# Patient Record
Sex: FEMALE | Race: WHITE | Employment: UNEMPLOYED | URBAN - METROPOLITAN AREA
[De-identification: names, ages, dates, MRNs, and addresses within clinical notes are randomized per-mention and may not be internally consistent; named-entity substitution may affect disease eponyms.]

---

## 2023-04-14 ENCOUNTER — OFFICE VISIT (OUTPATIENT)
Dept: PRIMARY CARE | Facility: CLINIC | Age: 88
End: 2023-04-14
Payer: MEDICARE

## 2023-04-14 VITALS
OXYGEN SATURATION: 97 % | HEIGHT: 65 IN | SYSTOLIC BLOOD PRESSURE: 134 MMHG | WEIGHT: 116.3 LBS | DIASTOLIC BLOOD PRESSURE: 78 MMHG | BODY MASS INDEX: 19.38 KG/M2 | HEART RATE: 70 BPM

## 2023-04-14 DIAGNOSIS — N18.2 STAGE 2 CHRONIC KIDNEY DISEASE: ICD-10-CM

## 2023-04-14 DIAGNOSIS — Z00.00 ROUTINE GENERAL MEDICAL EXAMINATION AT HEALTH CARE FACILITY: ICD-10-CM

## 2023-04-14 DIAGNOSIS — Z13.89 ENCOUNTER FOR SCREENING FOR OTHER DISORDER: ICD-10-CM

## 2023-04-14 DIAGNOSIS — E78.5 HYPERLIPIDEMIA, UNSPECIFIED HYPERLIPIDEMIA TYPE: ICD-10-CM

## 2023-04-14 DIAGNOSIS — E46 PROTEIN-CALORIE MALNUTRITION, UNSPECIFIED SEVERITY (MULTI): ICD-10-CM

## 2023-04-14 DIAGNOSIS — Z00.00 MEDICARE ANNUAL WELLNESS VISIT, SUBSEQUENT: Primary | ICD-10-CM

## 2023-04-14 DIAGNOSIS — H61.22 LEFT EAR IMPACTED CERUMEN: ICD-10-CM

## 2023-04-14 DIAGNOSIS — L98.9 SKIN LESION OF LEFT LOWER EXTREMITY: ICD-10-CM

## 2023-04-14 PROBLEM — R79.9 ABNORMAL BLOOD CHEMISTRY: Status: ACTIVE | Noted: 2023-04-14

## 2023-04-14 PROBLEM — R01.1 HEART MURMUR: Status: ACTIVE | Noted: 2023-04-14

## 2023-04-14 PROBLEM — H26.493 BILATERAL POSTERIOR CAPSULAR OPACIFICATION: Status: ACTIVE | Noted: 2023-04-14

## 2023-04-14 PROBLEM — R42 DIZZINESS: Status: RESOLVED | Noted: 2023-04-14 | Resolved: 2023-04-14

## 2023-04-14 PROBLEM — R06.00 DYSPNEA: Status: RESOLVED | Noted: 2023-04-14 | Resolved: 2023-04-14

## 2023-04-14 PROBLEM — M25.569 KNEE PAIN: Status: ACTIVE | Noted: 2023-04-14

## 2023-04-14 PROBLEM — Z85.51 HISTORY OF BLADDER CANCER: Status: ACTIVE | Noted: 2023-04-14

## 2023-04-14 PROBLEM — H35.30 AMD (AGE-RELATED MACULAR DEGENERATION), BILATERAL: Status: ACTIVE | Noted: 2023-04-14

## 2023-04-14 PROBLEM — T14.8XXA OPEN WOUND: Status: RESOLVED | Noted: 2023-04-14 | Resolved: 2023-04-14

## 2023-04-14 PROBLEM — B02.9 SHINGLES: Status: ACTIVE | Noted: 2023-04-14

## 2023-04-14 PROBLEM — I67.9 CEREBRAL VASCULAR DISEASE: Status: ACTIVE | Noted: 2023-04-14

## 2023-04-14 PROBLEM — M19.90 ARTHRITIS: Status: ACTIVE | Noted: 2023-04-14

## 2023-04-14 PROBLEM — K86.9 PANCREATIC LESION (HHS-HCC): Status: RESOLVED | Noted: 2023-04-14 | Resolved: 2023-04-14

## 2023-04-14 PROBLEM — R31.9 HEMATURIA: Status: RESOLVED | Noted: 2023-04-14 | Resolved: 2023-04-14

## 2023-04-14 PROBLEM — M79.2 NEURALGIA: Status: ACTIVE | Noted: 2023-04-14

## 2023-04-14 PROBLEM — N18.30 CHRONIC KIDNEY DISEASE, STAGE 3 (MULTI): Status: RESOLVED | Noted: 2023-04-14 | Resolved: 2023-04-14

## 2023-04-14 PROBLEM — J30.9 ALLERGIC RHINITIS: Status: ACTIVE | Noted: 2023-04-14

## 2023-04-14 PROBLEM — C67.9 BLADDER CANCER (MULTI): Status: RESOLVED | Noted: 2023-04-14 | Resolved: 2023-04-14

## 2023-04-14 PROBLEM — N18.30 CHRONIC KIDNEY DISEASE, STAGE 3 (MULTI): Status: ACTIVE | Noted: 2023-04-14

## 2023-04-14 PROBLEM — N39.0 ACUTE LOWER UTI (URINARY TRACT INFECTION): Status: RESOLVED | Noted: 2023-04-14 | Resolved: 2023-04-14

## 2023-04-14 PROBLEM — R05.9 COUGH: Status: RESOLVED | Noted: 2023-04-14 | Resolved: 2023-04-14

## 2023-04-14 PROBLEM — H10.9 CONJUNCTIVITIS: Status: RESOLVED | Noted: 2023-04-14 | Resolved: 2023-04-14

## 2023-04-14 PROBLEM — J20.9 ACUTE BRONCHITIS: Status: RESOLVED | Noted: 2023-04-14 | Resolved: 2023-04-14

## 2023-04-14 PROBLEM — H02.109 ECTROPION: Status: ACTIVE | Noted: 2023-04-14

## 2023-04-14 PROBLEM — H02.132 SENILE ECTROPION OF RIGHT LOWER EYELID: Status: ACTIVE | Noted: 2023-04-14

## 2023-04-14 PROCEDURE — G0439 PPPS, SUBSEQ VISIT: HCPCS | Performed by: PHYSICIAN ASSISTANT

## 2023-04-14 PROCEDURE — 1123F ACP DISCUSS/DSCN MKR DOCD: CPT | Performed by: PHYSICIAN ASSISTANT

## 2023-04-14 PROCEDURE — G0444 DEPRESSION SCREEN ANNUAL: HCPCS | Performed by: PHYSICIAN ASSISTANT

## 2023-04-14 PROCEDURE — 1160F RVW MEDS BY RX/DR IN RCRD: CPT | Performed by: PHYSICIAN ASSISTANT

## 2023-04-14 PROCEDURE — 69209 REMOVE IMPACTED EAR WAX UNI: CPT | Performed by: PHYSICIAN ASSISTANT

## 2023-04-14 PROCEDURE — 99213 OFFICE O/P EST LOW 20 MIN: CPT | Performed by: PHYSICIAN ASSISTANT

## 2023-04-14 PROCEDURE — 1159F MED LIST DOCD IN RCRD: CPT | Performed by: PHYSICIAN ASSISTANT

## 2023-04-14 PROCEDURE — 1033F TOBACCO NONSMOKER NOR 2NDHND: CPT | Performed by: PHYSICIAN ASSISTANT

## 2023-04-14 PROCEDURE — 1170F FXNL STATUS ASSESSED: CPT | Performed by: PHYSICIAN ASSISTANT

## 2023-04-14 PROCEDURE — 1036F TOBACCO NON-USER: CPT | Performed by: PHYSICIAN ASSISTANT

## 2023-04-14 RX ORDER — CARBOXYMETHYLCELLULOSE SODIUM 10 MG/ML
1 GEL OPHTHALMIC 2 TIMES DAILY PRN
COMMUNITY
End: 2023-10-20 | Stop reason: ALTCHOICE

## 2023-04-14 RX ORDER — EZETIMIBE AND SIMVASTATIN 10; 10 MG/1; MG/1
1 TABLET ORAL EVERY EVENING
COMMUNITY
Start: 2021-09-16 | End: 2023-06-12

## 2023-04-14 RX ORDER — NAPROXEN SODIUM 220 MG/1
81 TABLET, FILM COATED ORAL DAILY
COMMUNITY
End: 2023-10-20 | Stop reason: ALTCHOICE

## 2023-04-14 ASSESSMENT — ACTIVITIES OF DAILY LIVING (ADL)
TAKING_MEDICATION: INDEPENDENT
MANAGING_FINANCES: NEEDS ASSISTANCE
GROCERY_SHOPPING: TOTAL CARE
BATHING: NEEDS ASSISTANCE
DRESSING: INDEPENDENT
DOING_HOUSEWORK: NEEDS ASSISTANCE

## 2023-04-14 ASSESSMENT — PATIENT HEALTH QUESTIONNAIRE - PHQ9
2. FEELING DOWN, DEPRESSED OR HOPELESS: NOT AT ALL
1. LITTLE INTEREST OR PLEASURE IN DOING THINGS: NOT AT ALL
SUM OF ALL RESPONSES TO PHQ9 QUESTIONS 1 AND 2: 0

## 2023-04-14 ASSESSMENT — ENCOUNTER SYMPTOMS
OCCASIONAL FEELINGS OF UNSTEADINESS: 1
LOSS OF SENSATION IN FEET: 0
DEPRESSION: 0

## 2023-04-14 NOTE — PROGRESS NOTES
"Subjective   Reason for Visit: Saritha Kapadia is an 89 y.o. female here for a Medicare Wellness visit.     Past Medical, Surgical, and Family History reviewed and updated in chart.         -year-old female presenting with her family for Medicare wellness visit.  Overall doing okay.     Left leg lesion, nonhealing: Presents for past 6+ months.  Located on left anterior thigh just superior to the kneecap.  There is no surrounding redness, abnormal warmth, swelling.  It will occasionally bleed per patient and family.  Family concerned as the area is not healing.      Left wrist swelling: Patient denies any redness, warmth, pain, or weakness in the left wrist.  No specific injury recalled.  She has not tried anything for the swelling.    HLD: Compliant with ezetimibe-simvastatin 10-10 mg and 81 mg ASA.    CKD, stage II: Last CMP 10/11/2022 showed GFR 60, creatinine 0.87, BUN 24.    Ectropion, macular degeneration: Follows with Dr. Pedroza, last seen 4/9/2021. On AREDS.  She uses refresh drops to keep her eye wet and not irritated.    Falls: 2 in past 6 months.   One occurred when walking down stairs she was on her second to last step and thought she was on the floor.  She went to walk forward and missed the step, falling forward.  No injury recalled.  She does ambulate with a walker majority of the time.  Currently she lives alone and has to climb stairs in order to use the bathroom. Refuses to be placed in an assisted living facility.   Her family cooks meals for her often and delivers them to her.  She is supplementing diet with boost/ensure as needed    Patient Care Team:  Carey Soria PA-C as PCP - General     Objective   Vitals:  /78   Pulse 70   Ht 1.651 m (5' 5\")   Wt 52.8 kg (116 lb 4.8 oz)   SpO2 97%   BMI 19.35 kg/m²       Physical Exam  Vitals reviewed.   Constitutional:       General: She is not in acute distress.     Appearance: Normal appearance.   HENT:      Head: Normocephalic and " atraumatic.      Right Ear: Tympanic membrane, ear canal and external ear normal. There is no impacted cerumen.      Left Ear: Tympanic membrane, ear canal and external ear normal. There is impacted cerumen.      Nose: Nose normal. No congestion or rhinorrhea.      Mouth/Throat:      Mouth: Mucous membranes are moist.      Pharynx: Oropharynx is clear. No oropharyngeal exudate or posterior oropharyngeal erythema.   Eyes:      General: No scleral icterus.        Right eye: No discharge.         Left eye: No discharge.      Extraocular Movements: Extraocular movements intact.      Conjunctiva/sclera: Conjunctivae normal.      Pupils: Pupils are equal, round, and reactive to light.      Comments: Ectropion of right lower leg   Cardiovascular:      Rate and Rhythm: Normal rate and regular rhythm.      Heart sounds: Normal heart sounds. No murmur heard.     No friction rub. No gallop.   Pulmonary:      Effort: Pulmonary effort is normal. No respiratory distress.      Breath sounds: Normal breath sounds. No stridor. No wheezing, rhonchi or rales.   Abdominal:      General: Bowel sounds are normal. There is no distension.      Palpations: Abdomen is soft. There is no mass.      Tenderness: There is no abdominal tenderness. There is no right CVA tenderness or left CVA tenderness.   Musculoskeletal:         General: Normal range of motion.      Cervical back: Normal range of motion and neck supple.      Right lower leg: No edema.      Left lower leg: No edema.   Skin:     General: Skin is warm and dry.      Findings: No rash.   Neurological:      General: No focal deficit present.      Mental Status: She is alert and oriented to person, place, and time. Mental status is at baseline.      Cranial Nerves: No cranial nerve deficit.      Gait: Gait normal.   Psychiatric:         Mood and Affect: Mood normal.         Behavior: Behavior normal.         Assessment/Plan   Problem List Items Addressed This Visit       Hyperlipidemia     Current Assessment & Plan     Continue ezetimibe-simvastatin 10-10 mg and 81 mg ASA. Follow mediterranean style diet and exercise as tolerated.          Protein-calorie malnutrition, unspecified severity (CMS/HCC)    Current Assessment & Plan     Supplement diet with nutritional shakes if weight is not stable.          Stage 2 chronic kidney disease    Current Assessment & Plan     Stable. Avoid nephrotoxic medications. Drink adequate water.          Left ear impacted cerumen    Current Assessment & Plan     Flushed in office, tolerated well.          Relevant Orders    Ear cerumen removal    Skin lesion of left lower extremity    Current Assessment & Plan     Referral to Dermatology placed. Apply topical Neosporin.          Relevant Orders    Referral to Dermatology     Other Visit Diagnoses       Medicare annual wellness visit, subsequent    -  Primary    Routine general medical examination at health care facility        Encounter for screening for other disorder

## 2023-04-14 NOTE — PROGRESS NOTES
Patient ID: Saritha Kapadia is a 89 y.o. female.    Ear Cerumen Removal    Date/Time: 4/14/2023 11:26 AM    Performed by: Pooja Miramontes CMA  Authorized by: Carey Soria PA-C    Consent:     Consent obtained:  Verbal    Consent given by:  Patient    Risks, benefits, and alternatives were discussed: yes    Universal protocol:     Patient identity confirmed:  Verbally with patient  Procedure details:     Location:  L ear    Procedure type: irrigation      Procedure outcomes: cerumen removed    Post-procedure details:     Inspection:  TM intact    Hearing quality:  Improved    Procedure completion:  Tolerated with difficulty

## 2023-04-16 PROBLEM — N18.2 STAGE 2 CHRONIC KIDNEY DISEASE: Status: ACTIVE | Noted: 2023-04-16

## 2023-04-16 PROBLEM — L98.9 SKIN LESION OF LEFT LOWER EXTREMITY: Status: ACTIVE | Noted: 2023-04-16

## 2023-04-16 PROBLEM — H61.22 LEFT EAR IMPACTED CERUMEN: Status: ACTIVE | Noted: 2023-04-16

## 2023-04-16 PROBLEM — B02.9 SHINGLES: Status: RESOLVED | Noted: 2023-04-14 | Resolved: 2023-04-16

## 2023-04-16 NOTE — ASSESSMENT & PLAN NOTE
Continue ezetimibe-simvastatin 10-10 mg and 81 mg ASA. Follow mediterranean style diet and exercise as tolerated.

## 2023-06-12 DIAGNOSIS — E78.5 HYPERLIPIDEMIA, UNSPECIFIED HYPERLIPIDEMIA TYPE: Primary | ICD-10-CM

## 2023-06-12 RX ORDER — EZETIMIBE AND SIMVASTATIN 10; 10 MG/1; MG/1
TABLET ORAL
Qty: 90 TABLET | Refills: 1 | Status: SHIPPED | OUTPATIENT
Start: 2023-06-12 | End: 2023-10-26

## 2023-10-11 ENCOUNTER — TELEPHONE (OUTPATIENT)
Dept: PRIMARY CARE | Facility: CLINIC | Age: 88
End: 2023-10-11
Payer: MEDICARE

## 2023-10-11 NOTE — TELEPHONE ENCOUNTER
Chico reports that he has visited his mother and notes she has sores on her left leg, looks like blisters, would like a provider to see patient at [facility].

## 2023-10-13 ENCOUNTER — APPOINTMENT (OUTPATIENT)
Dept: PRIMARY CARE | Facility: CLINIC | Age: 88
End: 2023-10-13
Payer: MEDICARE

## 2023-10-18 ENCOUNTER — NURSING HOME VISIT (OUTPATIENT)
Dept: PRIMARY CARE | Facility: CLINIC | Age: 88
End: 2023-10-18
Payer: MEDICARE

## 2023-10-18 DIAGNOSIS — Z71.89 ADVANCED CARE PLANNING/COUNSELING DISCUSSION: ICD-10-CM

## 2023-10-18 DIAGNOSIS — M19.90 ARTHRITIS: ICD-10-CM

## 2023-10-18 DIAGNOSIS — H02.401 PTOSIS OF RIGHT EYELID: ICD-10-CM

## 2023-10-18 DIAGNOSIS — F03.90 DEMENTIA, UNSPECIFIED DEMENTIA SEVERITY, UNSPECIFIED DEMENTIA TYPE, UNSPECIFIED WHETHER BEHAVIORAL, PSYCHOTIC, OR MOOD DISTURBANCE OR ANXIETY (MULTI): Primary | ICD-10-CM

## 2023-10-18 PROCEDURE — 99349 HOME/RES VST EST MOD MDM 40: CPT

## 2023-10-18 PROCEDURE — 99497 ADVNCD CARE PLAN 30 MIN: CPT

## 2023-10-18 ASSESSMENT — PAIN SCALES - GENERAL: PAINLEVEL: 0-NO PAIN

## 2023-10-20 VITALS
HEART RATE: 97 BPM | RESPIRATION RATE: 16 BRPM | SYSTOLIC BLOOD PRESSURE: 128 MMHG | OXYGEN SATURATION: 98 % | DIASTOLIC BLOOD PRESSURE: 62 MMHG

## 2023-10-20 PROBLEM — F03.90 DEMENTIA, UNSPECIFIED DEMENTIA SEVERITY, UNSPECIFIED DEMENTIA TYPE, UNSPECIFIED WHETHER BEHAVIORAL, PSYCHOTIC, OR MOOD DISTURBANCE OR ANXIETY (MULTI): Status: ACTIVE | Noted: 2023-10-20

## 2023-10-20 RX ORDER — ACETAMINOPHEN 325 MG/1
650 TABLET ORAL EVERY 6 HOURS PRN
COMMUNITY

## 2023-10-20 ASSESSMENT — ENCOUNTER SYMPTOMS
DIZZINESS: 0
FEVER: 0
HEADACHES: 0
EYE REDNESS: 1
APPETITE CHANGE: 0
GASTROINTESTINAL NEGATIVE: 1
SHORTNESS OF BREATH: 0
ARTHRALGIAS: 0
PSYCHIATRIC NEGATIVE: 1
ENDOCRINE NEGATIVE: 1
ALLERGIC/IMMUNOLOGIC NEGATIVE: 1
COUGH: 0
RHINORRHEA: 0
FREQUENCY: 0
SLEEP DISTURBANCE: 0
CHILLS: 0
FATIGUE: 0

## 2023-10-20 NOTE — PATIENT INSTRUCTIONS

## 2023-10-20 NOTE — PROGRESS NOTES
"Subjective   Patient ID: Saritha Kapadia is a 89 y.o. female who is assisted living/ home patient being seen and evaluated for annual wellness exam.    HPI Pt currently resides in assisted living, oriented x1, comfortable and denies pain. Pt lying in bed, and welcomed visit. Pt answers limited to yes, no, ok, and short answer responses. Pt denies changes in vision, and hearing. Pt wears a full denture, and denies difficulty with chewing and swallowing. Pt states appetite is good. Pt denies headache, dizziness, congestion and runny nose despite dirty tissues bedside.     Pt denies chest pain and sob at rest and exertion. Pt denies voiding symptoms and constipation. Pt with arthritis to bilateral hands, but denies it is bothersome. Pt up in w/c, transfers with one person assist, and requires assistance with ADL's. Pt denies recent falls. Pt denies sleep disturbances. Pt denies feelings of anxiety and sadness. Pt with closed abrasions to lower ankles. Nursing reports \"pt gets into kicking matches with table mate at lunch\". Pt with no further questions. TC made to son and updated on visit.     Current Outpatient Medications on File Prior to Visit   Medication Sig Dispense Refill    acetaminophen (TylenoL) 325 mg tablet Take 2 tablets (650 mg) by mouth every 6 hours if needed for mild pain (1 - 3), headaches or fever (temp greater than 38.0 C).      ezetimibe-simvastatin (Vytorin) 10-10 mg tablet TAKE 1 TABLET DAILY IN THE EVENING 90 tablet 1    nutritional drink (Boost) liquid Take by mouth.      vit A/vit C/vit E/zinc/copper (PRESERVISION AREDS ORAL) Take 1 capsule by mouth once daily.      white petrolatum-mineral oiL (Tears Naturale PM) 94-3 % ophthalmic ointment Apply 1 Application to right eye once daily at bedtime.      [DISCONTINUED] carboxymethylcellulose (Refresh Liquigel) 1 % ophthalmic solution dropperette Administer 1 drop into both eyes 2 times a day as needed for dry eyes.      [DISCONTINUED] aspirin " 81 mg chewable tablet Chew 1 tablet (81 mg) once daily.       No current facility-administered medications on file prior to visit.     Review of Systems   Constitutional:  Negative for appetite change, chills, fatigue and fever.   HENT:  Negative for congestion and rhinorrhea.    Eyes:  Positive for redness.   Respiratory:  Negative for cough and shortness of breath.    Cardiovascular:  Negative for leg swelling.   Gastrointestinal: Negative.    Endocrine: Negative.         Ptosis to right eye   Genitourinary: Negative.  Negative for frequency.   Musculoskeletal:  Negative for arthralgias.   Skin: Negative.  Negative for pallor.   Allergic/Immunologic: Negative.    Neurological:  Negative for dizziness and headaches.   Psychiatric/Behavioral: Negative.  Negative for sleep disturbance.        Objective   /62 (BP Location: Left arm, Patient Position: Lying, BP Cuff Size: Adult)   Pulse 97   Resp 16   SpO2 98%     Physical Exam  Constitutional:       General: She is not in acute distress.  HENT:      Head: Normocephalic.      Nose: No congestion or rhinorrhea.      Mouth/Throat:      Mouth: Mucous membranes are moist.   Eyes:      Pupils: Pupils are equal, round, and reactive to light.      Comments: Right eye ptosis   Cardiovascular:      Rate and Rhythm: Normal rate and regular rhythm.      Pulses: Normal pulses.      Heart sounds: Normal heart sounds.   Pulmonary:      Breath sounds: Normal breath sounds.   Abdominal:      General: Bowel sounds are normal.   Musculoskeletal:         General: Normal range of motion.      Cervical back: Normal range of motion.   Skin:     General: Skin is warm.      Capillary Refill: Capillary refill takes less than 2 seconds.             Comments: abrasion   Neurological:      Mental Status: She is alert. Mental status is at baseline.   Psychiatric:         Mood and Affect: Mood normal.         Behavior: Behavior normal.         Assessment/Plan   Diagnoses and all orders for  this visit:  Dementia, unspecified dementia severity, unspecified dementia type, unspecified whether behavioral, psychotic, or mood disturbance or anxiety (CMS/[facility])  Comments:  Continue to monitor for cognitive decline.  Advanced care planning/counseling discussion  Comments:  Discussion had with son regarding current full code status. Son made decision to make DNR. New DNR form placed in resident's chart.  Arthritis  Comments:  Pain controlled.  Ptosis of right eyelid  Comments:  Continue use of right eye ointment.  Other orders  -     DNR    Patient was identified as a fall risk. Risk prevention instructions provided. Pt uses w/c. Facility with fall interventions in place.

## 2023-10-26 ENCOUNTER — TELEPHONE (OUTPATIENT)
Dept: PRIMARY CARE | Facility: CLINIC | Age: 88
End: 2023-10-26
Payer: MEDICARE

## 2023-10-26 NOTE — TELEPHONE ENCOUNTER
Shannan Ray/ daughter phoned and asked to speak with you in follow up from yesterday. She requested to talk to you about her mother's statin Rx. She is questioning if she should still be on it. She reported a controversy with payment, she stated Rx went from one dollar to one hundred dollars per month.   She reported her brother is POA if you need to speak directly with him - Peter can be reached at [phone].

## 2023-12-03 PROBLEM — H54.7 VISION IMPAIRMENT: Status: ACTIVE | Noted: 2023-12-03

## 2024-01-11 ENCOUNTER — NURSING HOME VISIT (OUTPATIENT)
Dept: POST ACUTE CARE | Facility: EXTERNAL LOCATION | Age: 89
End: 2024-01-11
Payer: MEDICARE

## 2024-01-11 DIAGNOSIS — S69.92XA INJURY OF LEFT WRIST, INITIAL ENCOUNTER: Primary | ICD-10-CM

## 2024-01-11 PROCEDURE — 99348 HOME/RES VST EST LOW MDM 30: CPT

## 2024-01-11 NOTE — LETTER
"Patient: Saritha Kapadia  : 3/3/1923    Encounter Date: 2024    Subjective  Patient ID: Saritha Kapadia is a 89 y.o. female who is assisted living/ home patient being seen at [facility] for left wrist injury.     HPI   Pt visited in apartment of assisted living. Pt laying in bed, oriented x1, comfortable and denies pain. Pt with a horizontal line of red/purple ecchymosis across pt's wrist. Pt states \"I don't know how it happened\". Pt states \"It doesn't hurt\". Appears wrist may have gotten caught between an object. Slight edema present. Pt admits to having full range of motion of left wrist, and hand. Pt able to wiggle fingers, and hand without difficulty. Good strength noted. No pain on assessment. HPI limited due to cognition secondary to dementia.     Review of Systems   Constitutional:  Negative for chills, fatigue and fever.   HENT:  Negative for congestion and rhinorrhea.    Respiratory:  Negative for cough and shortness of breath.    Cardiovascular:  Negative for chest pain.   Neurological:  Negative for dizziness and headaches.   Hematological:  Bruises/bleeds easily.   Psychiatric/Behavioral:  The patient is not nervous/anxious.        Objective  There were no vitals taken for this visit.    Physical Exam  Constitutional:       General: She is not in acute distress.     Appearance: She is not ill-appearing.   HENT:      Head: Normocephalic.      Nose: Nose normal.      Mouth/Throat:      Mouth: Mucous membranes are moist.   Eyes:      Conjunctiva/sclera: Conjunctivae normal.   Cardiovascular:      Pulses: Normal pulses.   Pulmonary:      Effort: Pulmonary effort is normal. No respiratory distress.   Musculoskeletal:         General: Swelling present. No tenderness.      Left wrist: Swelling present. No deformity, tenderness or bony tenderness. Normal range of motion.        Arms:       Comments: Light swelling with ecchymosis   Skin:     General: Skin is warm.      Capillary " Refill: Capillary refill takes less than 2 seconds.      Findings: Ecchymosis present.             Comments: Slight edema   Neurological:      Mental Status: She is alert. Mental status is at baseline.      Comments: Oriented x1   Psychiatric:         Mood and Affect: Mood normal.         Behavior: Behavior normal.         Assessment/Plan  Diagnoses and all orders for this visit:  Injury of left wrist, initial encounter  Comments:  Continue to monitor. Apply ice packs three times daily for 15 mins and as needed for edema.            Electronically Signed By: ELLIE Armando   1/14/24  2:05 PM

## 2024-01-14 ASSESSMENT — ENCOUNTER SYMPTOMS
CHILLS: 0
SHORTNESS OF BREATH: 0
HEADACHES: 0
RHINORRHEA: 0
DIZZINESS: 0
FATIGUE: 0
NERVOUS/ANXIOUS: 0
BRUISES/BLEEDS EASILY: 1
FEVER: 0
COUGH: 0

## 2024-01-14 NOTE — PROGRESS NOTES
"Subjective   Patient ID: Saritha Kapadia is a 89 y.o. female who is assisted living/ home patient being seen at [facility] for left wrist injury.     HPI   Pt visited in apartment of assisted living. Pt laying in bed, oriented x1, comfortable and denies pain. Pt with a horizontal line of red/purple ecchymosis across pt's wrist. Pt states \"I don't know how it happened\". Pt states \"It doesn't hurt\". Appears wrist may have gotten caught between an object. Slight edema present. Pt admits to having full range of motion of left wrist, and hand. Pt able to wiggle fingers, and hand without difficulty. Good strength noted. No pain on assessment. HPI limited due to cognition secondary to dementia.     Review of Systems   Constitutional:  Negative for chills, fatigue and fever.   HENT:  Negative for congestion and rhinorrhea.    Respiratory:  Negative for cough and shortness of breath.    Cardiovascular:  Negative for chest pain.   Neurological:  Negative for dizziness and headaches.   Hematological:  Bruises/bleeds easily.   Psychiatric/Behavioral:  The patient is not nervous/anxious.        Objective   There were no vitals taken for this visit.    Physical Exam  Constitutional:       General: She is not in acute distress.     Appearance: She is not ill-appearing.   HENT:      Head: Normocephalic.      Nose: Nose normal.      Mouth/Throat:      Mouth: Mucous membranes are moist.   Eyes:      Conjunctiva/sclera: Conjunctivae normal.   Cardiovascular:      Pulses: Normal pulses.   Pulmonary:      Effort: Pulmonary effort is normal. No respiratory distress.   Musculoskeletal:         General: Swelling present. No tenderness.      Left wrist: Swelling present. No deformity, tenderness or bony tenderness. Normal range of motion.        Arms:       Comments: Light swelling with ecchymosis   Skin:     General: Skin is warm.      Capillary Refill: Capillary refill takes less than 2 seconds.      Findings: Ecchymosis " present.             Comments: Slight edema   Neurological:      Mental Status: She is alert. Mental status is at baseline.      Comments: Oriented x1   Psychiatric:         Mood and Affect: Mood normal.         Behavior: Behavior normal.         Assessment/Plan   Diagnoses and all orders for this visit:  Injury of left wrist, initial encounter  Comments:  Continue to monitor. Apply ice packs three times daily for 15 mins and as needed for edema.

## 2024-02-28 ENCOUNTER — NURSING HOME VISIT (OUTPATIENT)
Dept: POST ACUTE CARE | Facility: EXTERNAL LOCATION | Age: 89
End: 2024-02-28
Payer: MEDICARE

## 2024-02-28 DIAGNOSIS — F03.A0 MILD DEMENTIA WITHOUT BEHAVIORAL DISTURBANCE, PSYCHOTIC DISTURBANCE, MOOD DISTURBANCE, OR ANXIETY, UNSPECIFIED DEMENTIA TYPE (MULTI): Primary | ICD-10-CM

## 2024-02-28 DIAGNOSIS — M19.90 ARTHRITIS: ICD-10-CM

## 2024-02-28 DIAGNOSIS — E46 PROTEIN-CALORIE MALNUTRITION, UNSPECIFIED SEVERITY (MULTI): ICD-10-CM

## 2024-02-28 PROCEDURE — 99349 HOME/RES VST EST MOD MDM 40: CPT

## 2024-02-28 ASSESSMENT — PAIN SCALES - GENERAL: PAINLEVEL: 0-NO PAIN

## 2024-02-28 NOTE — LETTER
Patient: Saritha Kapadia  : 3/3/1923    Encounter Date: 2024    Subjective  Patient ID: Saritha Kapadia is a 89 y.o. female who is assisted living/ home patient being seen at [facility] and evaluated for Routine AL Follow Up.     HPI   Pt currently resides in assisted living, oriented x1, comfortable and denies pain. Pt lying in bed, and welcomed visit. Pt answers with short responses, and yes and no. Pt denies changes in vision, and hearing. Pt wears a full denture, and denies difficulty with chewing and swallowing. Nursing reports patient's appetite is poor, and is eating less than 25% of all meals.  Pt denies headache, dizziness, congestion and runny nose. Pt with a dry non productive cough, declining medication at this time.     Pt denies chest pain and sob at rest and exertion. Pt denies voiding symptoms and constipation. Pt with arthritis to bilateral hands, but denies it is bothersome. Pt uses wheelchair for mobility, transfers with one person assist, and requires assistance with ADL's. Pt with a recent fall on 2/10 without injury. Pt denies sleep disturbances. Pt denies feelings of anxiety and sadness.     Current Outpatient Medications on File Prior to Visit   Medication Sig Dispense Refill   • acetaminophen (TylenoL) 325 mg tablet Take 2 tablets (650 mg) by mouth every 6 hours if needed for mild pain (1 - 3), headaches or fever (temp greater than 38.0 C).     • nutritional drink (Boost) liquid Take by mouth.     • vit A/vit C/vit E/zinc/copper (PRESERVISION AREDS ORAL) Take 1 capsule by mouth once daily.     • white petrolatum-mineral oiL (Tears Naturale PM) 94-3 % ophthalmic ointment Apply 1 Application to right eye once daily at bedtime.       No current facility-administered medications on file prior to visit.      Review of Systems   Constitutional:  Negative for appetite change, chills, fatigue and fever.   HENT:  Negative for congestion and rhinorrhea.    Respiratory:   Positive for cough. Negative for shortness of breath.    Cardiovascular:  Negative for leg swelling.   Gastrointestinal: Negative.  Negative for nausea and vomiting.   Endocrine: Negative.         Ptosis to right eye   Genitourinary: Negative.  Negative for frequency.   Musculoskeletal:  Negative for arthralgias.   Skin: Negative.  Negative for pallor.   Allergic/Immunologic: Negative.    Neurological:  Negative for dizziness and headaches.   Psychiatric/Behavioral: Negative.  Negative for sleep disturbance. The patient is not nervous/anxious.        Objective  /68 (BP Location: Left arm, Patient Position: Sitting, BP Cuff Size: Adult)   Pulse 90   Resp 16   SpO2 97%     Physical Exam  Constitutional:       General: She is not in acute distress.  HENT:      Head: Normocephalic.      Nose: No congestion or rhinorrhea.      Mouth/Throat:      Mouth: Mucous membranes are moist.   Eyes:      Pupils: Pupils are equal, round, and reactive to light.      Comments: Right eye ptosis   Cardiovascular:      Rate and Rhythm: Normal rate and regular rhythm.      Pulses: Normal pulses.      Heart sounds: Normal heart sounds.   Pulmonary:      Breath sounds: Normal breath sounds.   Abdominal:      General: Bowel sounds are normal.   Musculoskeletal:         General: Normal range of motion.      Cervical back: Normal range of motion.   Skin:     General: Skin is warm.      Capillary Refill: Capillary refill takes less than 2 seconds.   Neurological:      Mental Status: She is alert. Mental status is at baseline.   Psychiatric:         Mood and Affect: Mood normal.         Behavior: Behavior normal.         Assessment/Plan  Diagnoses and all orders for this visit:  Mild dementia without behavioral disturbance, psychotic disturbance, mood disturbance, or anxiety, unspecified dementia type (CMS/HCC)  Comments:  Continue to monitor for cognitive decline.  Arthritis  Comments:  Pain controlled.  Protein-calorie malnutrition,  unspecified severity (CMS/HCC)  Comments:  Continue drinking boost 2-3x a day.            Electronically Signed By: ELLIE Armando   3/3/24  2:23 PM

## 2024-03-03 VITALS
HEART RATE: 90 BPM | RESPIRATION RATE: 16 BRPM | DIASTOLIC BLOOD PRESSURE: 68 MMHG | OXYGEN SATURATION: 97 % | SYSTOLIC BLOOD PRESSURE: 118 MMHG

## 2024-03-03 PROBLEM — L98.9 SKIN LESION OF LEFT LOWER EXTREMITY: Status: RESOLVED | Noted: 2023-04-16 | Resolved: 2024-03-03

## 2024-03-03 ASSESSMENT — ENCOUNTER SYMPTOMS
ENDOCRINE NEGATIVE: 1
NAUSEA: 0
VOMITING: 0
GASTROINTESTINAL NEGATIVE: 1
APPETITE CHANGE: 0
SLEEP DISTURBANCE: 0
DIZZINESS: 0
RHINORRHEA: 0
SHORTNESS OF BREATH: 0
FATIGUE: 0
PSYCHIATRIC NEGATIVE: 1
CHILLS: 0
ALLERGIC/IMMUNOLOGIC NEGATIVE: 1
HEADACHES: 0
FREQUENCY: 0
ARTHRALGIAS: 0
FEVER: 0
COUGH: 1
NERVOUS/ANXIOUS: 0

## 2024-03-03 NOTE — PROGRESS NOTES
Subjective   Patient ID: Saritha Kapadia is a 89 y.o. female who is assisted living/ home patient being seen at [facility] and evaluated for Routine AL Follow Up.     HPI   Pt currently resides in assisted living, oriented x1, comfortable and denies pain. Pt lying in bed, and welcomed visit. Pt answers with short responses, and yes and no. Pt denies changes in vision, and hearing. Pt wears a full denture, and denies difficulty with chewing and swallowing. Nursing reports patient's appetite is poor, and is eating less than 25% of all meals.  Pt denies headache, dizziness, congestion and runny nose. Pt with a dry non productive cough, declining medication at this time.     Pt denies chest pain and sob at rest and exertion. Pt denies voiding symptoms and constipation. Pt with arthritis to bilateral hands, but denies it is bothersome. Pt uses wheelchair for mobility, transfers with one person assist, and requires assistance with ADL's. Pt with a recent fall on 2/10 without injury. Pt denies sleep disturbances. Pt denies feelings of anxiety and sadness.     Current Outpatient Medications on File Prior to Visit   Medication Sig Dispense Refill    acetaminophen (TylenoL) 325 mg tablet Take 2 tablets (650 mg) by mouth every 6 hours if needed for mild pain (1 - 3), headaches or fever (temp greater than 38.0 C).      nutritional drink (Boost) liquid Take by mouth.      vit A/vit C/vit E/zinc/copper (PRESERVISION AREDS ORAL) Take 1 capsule by mouth once daily.      white petrolatum-mineral oiL (Tears Naturale PM) 94-3 % ophthalmic ointment Apply 1 Application to right eye once daily at bedtime.       No current facility-administered medications on file prior to visit.      Review of Systems   Constitutional:  Negative for appetite change, chills, fatigue and fever.   HENT:  Negative for congestion and rhinorrhea.    Respiratory:  Positive for cough. Negative for shortness of breath.    Cardiovascular:   Negative for leg swelling.   Gastrointestinal: Negative.  Negative for nausea and vomiting.   Endocrine: Negative.         Ptosis to right eye   Genitourinary: Negative.  Negative for frequency.   Musculoskeletal:  Negative for arthralgias.   Skin: Negative.  Negative for pallor.   Allergic/Immunologic: Negative.    Neurological:  Negative for dizziness and headaches.   Psychiatric/Behavioral: Negative.  Negative for sleep disturbance. The patient is not nervous/anxious.        Objective   /68 (BP Location: Left arm, Patient Position: Sitting, BP Cuff Size: Adult)   Pulse 90   Resp 16   SpO2 97%     Physical Exam  Constitutional:       General: She is not in acute distress.  HENT:      Head: Normocephalic.      Nose: No congestion or rhinorrhea.      Mouth/Throat:      Mouth: Mucous membranes are moist.   Eyes:      Pupils: Pupils are equal, round, and reactive to light.      Comments: Right eye ptosis   Cardiovascular:      Rate and Rhythm: Normal rate and regular rhythm.      Pulses: Normal pulses.      Heart sounds: Normal heart sounds.   Pulmonary:      Breath sounds: Normal breath sounds.   Abdominal:      General: Bowel sounds are normal.   Musculoskeletal:         General: Normal range of motion.      Cervical back: Normal range of motion.   Skin:     General: Skin is warm.      Capillary Refill: Capillary refill takes less than 2 seconds.   Neurological:      Mental Status: She is alert. Mental status is at baseline.   Psychiatric:         Mood and Affect: Mood normal.         Behavior: Behavior normal.         Assessment/Plan   Diagnoses and all orders for this visit:  Mild dementia without behavioral disturbance, psychotic disturbance, mood disturbance, or anxiety, unspecified dementia type (CMS/HCC)  Comments:  Continue to monitor for cognitive decline.  Arthritis  Comments:  Pain controlled.  Protein-calorie malnutrition, unspecified severity (CMS/HCC)  Comments:  Continue drinking boost 2-3x a  day.

## 2024-03-13 ENCOUNTER — NURSING HOME VISIT (OUTPATIENT)
Dept: POST ACUTE CARE | Facility: EXTERNAL LOCATION | Age: 89
End: 2024-03-13
Payer: MEDICARE

## 2024-03-13 DIAGNOSIS — R13.19 OTHER DYSPHAGIA: Primary | ICD-10-CM

## 2024-03-13 PROCEDURE — 99348 HOME/RES VST EST LOW MDM 30: CPT

## 2024-03-13 NOTE — LETTER
Patient: Saritha Kapadia  : 3/3/1923    Encounter Date: 2024    Subjective  Patient ID: Saritha Kapadia is a 89 y.o. female who is assisted living/ home patient being seen at   [facility] for reports of dysphagia.     HPI   Pt visited in apartment, oriented x1, comfortable and denies pain. Pt recently lost/misplaced denture, having difficulty with chewing and swallowing without it. Appetite has been poor since this event. Pt was last seen two weeks ago for a vist, and was eating cheetos with denture in place without difficulty. Pt drowsy during visit. Unable to answer questions.     Current Outpatient Medications on File Prior to Visit   Medication Sig Dispense Refill   • acetaminophen (TylenoL) 325 mg tablet Take 2 tablets (650 mg) by mouth every 6 hours if needed for mild pain (1 - 3), headaches or fever (temp greater than 38.0 C).     • nutritional drink (Boost) liquid Take by mouth.     • vit A/vit C/vit E/zinc/copper (PRESERVISION AREDS ORAL) Take 1 capsule by mouth once daily.     • white petrolatum-mineral oiL (Tears Naturale PM) 94-3 % ophthalmic ointment Apply 1 Application to right eye once daily at bedtime.       No current facility-administered medications on file prior to visit.        Review of Systems   Constitutional:  Positive for appetite change and fatigue.   HENT:  Positive for dental problem and trouble swallowing.    Respiratory:  Negative for cough and shortness of breath.    Gastrointestinal: Negative.  Negative for nausea and vomiting.   Endocrine: Negative.         Ptosis to right eye   Musculoskeletal:  Positive for gait problem.   Allergic/Immunologic: Negative.    Psychiatric/Behavioral:  The patient is not nervous/anxious.        Objective  There were no vitals taken for this visit.    Physical Exam  Constitutional:       General: She is not in acute distress.     Appearance: She is not ill-appearing.      Comments: drowsy   HENT:      Head: Normocephalic.       Nose: No congestion or rhinorrhea.      Mouth/Throat:      Mouth: Mucous membranes are dry.      Comments: edentulous  Eyes:      Comments: Right eye ptosis   Pulmonary:      Effort: Pulmonary effort is normal. No respiratory distress.   Abdominal:      General: Bowel sounds are normal.   Musculoskeletal:         General: Normal range of motion.      Cervical back: Normal range of motion.   Skin:     General: Skin is warm.      Capillary Refill: Capillary refill takes less than 2 seconds.   Neurological:      Mental Status: Mental status is at baseline.   Psychiatric:         Mood and Affect: Mood normal.         Behavior: Behavior normal.         Assessment/Plan  Diagnoses and all orders for this visit:  Other dysphagia  Comments:  Refer to ST to eval and treat. Pt lost dentures.- Decrease in appetite.            Electronically Signed By: GISELE Armando-CNP   3/14/24 11:17 PM

## 2024-03-14 PROBLEM — R13.11 ORAL PHASE DYSPHAGIA: Status: ACTIVE | Noted: 2024-03-14

## 2024-03-14 ASSESSMENT — ENCOUNTER SYMPTOMS
VOMITING: 0
ENDOCRINE NEGATIVE: 1
TROUBLE SWALLOWING: 1
GASTROINTESTINAL NEGATIVE: 1
COUGH: 0
FATIGUE: 1
SHORTNESS OF BREATH: 0
APPETITE CHANGE: 1
ALLERGIC/IMMUNOLOGIC NEGATIVE: 1
NAUSEA: 0
NERVOUS/ANXIOUS: 0

## 2024-03-15 NOTE — PROGRESS NOTES
Subjective   Patient ID: Saritha Kapadia is a 89 y.o. female who is assisted living/ home patient being seen at   [facility] for reports of dysphagia.     HPI   Pt visited in apartment, oriented x1, comfortable and denies pain. Pt recently lost/misplaced denture, having difficulty with chewing and swallowing without it. Appetite has been poor since this event. Pt was last seen two weeks ago for a vist, and was eating cheetos with denture in place without difficulty. Pt drowsy during visit. Unable to answer questions.     Current Outpatient Medications on File Prior to Visit   Medication Sig Dispense Refill    acetaminophen (TylenoL) 325 mg tablet Take 2 tablets (650 mg) by mouth every 6 hours if needed for mild pain (1 - 3), headaches or fever (temp greater than 38.0 C).      nutritional drink (Boost) liquid Take by mouth.      vit A/vit C/vit E/zinc/copper (PRESERVISION AREDS ORAL) Take 1 capsule by mouth once daily.      white petrolatum-mineral oiL (Tears Naturale PM) 94-3 % ophthalmic ointment Apply 1 Application to right eye once daily at bedtime.       No current facility-administered medications on file prior to visit.        Review of Systems   Constitutional:  Positive for appetite change and fatigue.   HENT:  Positive for dental problem and trouble swallowing.    Respiratory:  Negative for cough and shortness of breath.    Gastrointestinal: Negative.  Negative for nausea and vomiting.   Endocrine: Negative.         Ptosis to right eye   Musculoskeletal:  Positive for gait problem.   Allergic/Immunologic: Negative.    Psychiatric/Behavioral:  The patient is not nervous/anxious.        Objective   There were no vitals taken for this visit.    Physical Exam  Constitutional:       General: She is not in acute distress.     Appearance: She is not ill-appearing.      Comments: drowsy   HENT:      Head: Normocephalic.      Nose: No congestion or rhinorrhea.      Mouth/Throat:      Mouth: Mucous  membranes are dry.      Comments: edentulous  Eyes:      Comments: Right eye ptosis   Pulmonary:      Effort: Pulmonary effort is normal. No respiratory distress.   Abdominal:      General: Bowel sounds are normal.   Musculoskeletal:         General: Normal range of motion.      Cervical back: Normal range of motion.   Skin:     General: Skin is warm.      Capillary Refill: Capillary refill takes less than 2 seconds.   Neurological:      Mental Status: Mental status is at baseline.   Psychiatric:         Mood and Affect: Mood normal.         Behavior: Behavior normal.         Assessment/Plan   Diagnoses and all orders for this visit:  Other dysphagia  Comments:  Refer to ST to eval and treat. Pt lost dentures.- Decrease in appetite.

## 2024-03-18 PROCEDURE — G0180 MD CERTIFICATION HHA PATIENT: HCPCS

## 2024-05-29 ENCOUNTER — NURSING HOME VISIT (OUTPATIENT)
Dept: POST ACUTE CARE | Facility: EXTERNAL LOCATION | Age: 89
End: 2024-05-29
Payer: MEDICARE

## 2024-05-29 DIAGNOSIS — F01.50 VASCULAR DEMENTIA, UNSPECIFIED DEMENTIA SEVERITY, UNSPECIFIED WHETHER BEHAVIORAL, PSYCHOTIC, OR MOOD DISTURBANCE OR ANXIETY (MULTI): Primary | ICD-10-CM

## 2024-05-29 DIAGNOSIS — M19.90 ARTHRITIS: ICD-10-CM

## 2024-05-29 DIAGNOSIS — Z74.1 REQUIRES DAILY ASSISTANCE FOR ACTIVITIES OF DAILY LIVING (ADL) AND COMFORT NEEDS: ICD-10-CM

## 2024-05-29 DIAGNOSIS — H04.129 DRY EYE: ICD-10-CM

## 2024-05-29 PROCEDURE — 99349 HOME/RES VST EST MOD MDM 40: CPT

## 2024-05-29 ASSESSMENT — PAIN SCALES - GENERAL: PAINLEVEL: 0-NO PAIN

## 2024-05-29 NOTE — LETTER
Patient: Saritha Kapadia  : 3/3/1923    Encounter Date: 2024    Subjective  Patient ID: Saritha Kapadia is a 89 y.o. female who is assisted living/ home patient being seen at [facility], and evaluated for Annual AL Medical Evaluation.     HPI     Pt visited in apartment of assisted living, oriented x1, comfortable and denies pain. Pt lying in bed, and welcomed visit. Pt answers with short responses, and yes and no. Pt denies changes in vision, and hearing. Pt with an upper denture, lost lower denture, denies difficulty with chewing and swallowing. Pt states appetite is good. Pt denies headache, dizziness, congestion and runny nose.     Pt denies chest pain and sob at rest and exertion. Pt denies voiding symptoms and constipation. Pt with arthritis to bilateral hands, but denies it is bothersome. Pt uses wheelchair for mobility, transfers with one person assist, and requires assistance with ADL's. No recent falls reported. Pt denies sleep disturbances. Pt denies feelings of anxiety and sadness. HPI limited due to cognition related to dementia.     Current Outpatient Medications on File Prior to Visit   Medication Sig Dispense Refill   • acetaminophen (TylenoL) 325 mg tablet Take 2 tablets (650 mg) by mouth every 6 hours if needed for mild pain (1 - 3), headaches or fever (temp greater than 38.0 C).     • nutritional drink (Boost) liquid Take by mouth.     • vit A/vit C/vit E/zinc/copper (PRESERVISION AREDS ORAL) Take 1 capsule by mouth once daily.     • white petrolatum-mineral oiL (Tears Naturale PM) 94-3 % ophthalmic ointment Apply 1 Application to right eye once daily at bedtime.       No current facility-administered medications on file prior to visit.      Review of Systems   Constitutional:  Negative for appetite change, chills, fatigue and fever.   HENT:  Negative for congestion and rhinorrhea.    Eyes:  Positive for redness.   Respiratory:  Negative for cough and shortness of  breath.    Cardiovascular:  Negative for leg swelling.   Gastrointestinal: Negative.  Negative for constipation, diarrhea, nausea and vomiting.   Endocrine: Negative.         Ptosis to right eye   Genitourinary: Negative.  Negative for frequency.   Musculoskeletal:  Positive for arthralgias and gait problem.        Utilizes wheelchair for mobility   Skin: Negative.  Negative for pallor.   Allergic/Immunologic: Negative.    Neurological:  Positive for weakness. Negative for dizziness and headaches.   Psychiatric/Behavioral:  Positive for confusion. Negative for sleep disturbance. The patient is not nervous/anxious.        Objective  /60 (BP Location: Left arm, Patient Position: Lying, BP Cuff Size: Adult)   Pulse 83   Resp 16   SpO2 97%     Physical Exam  Constitutional:       General: She is awake. She is not in acute distress.     Appearance: She is not ill-appearing.   HENT:      Head: Normocephalic.      Nose: No congestion or rhinorrhea.      Mouth/Throat:      Mouth: Mucous membranes are moist.   Eyes:      Pupils: Pupils are equal, round, and reactive to light.      Comments: Right eye ptosis   Cardiovascular:      Rate and Rhythm: Normal rate and regular rhythm.      Pulses: Normal pulses.      Heart sounds: Normal heart sounds.   Pulmonary:      Effort: Pulmonary effort is normal. No respiratory distress.      Breath sounds: Normal breath sounds. No wheezing or rhonchi.   Abdominal:      General: Bowel sounds are normal.   Musculoskeletal:         General: Normal range of motion.      Cervical back: Normal range of motion.      Right lower leg: No edema.      Left lower leg: No edema.   Skin:     General: Skin is warm.      Capillary Refill: Capillary refill takes less than 2 seconds.   Neurological:      Mental Status: She is alert. Mental status is at baseline.      Comments: Oriented x1   Psychiatric:         Mood and Affect: Mood normal.         Behavior: Behavior normal. Behavior is  cooperative.         Cognition and Memory: Cognition is impaired. Memory is impaired.         Assessment/Plan  Diagnoses and all orders for this visit:  Vascular dementia, unspecified dementia severity, unspecified whether behavioral, psychotic, or mood disturbance or anxiety (Multi)  Comments:  Monitor for cognitive decline.  Arthritis  Comments:  Pain controlled.  Dry eye  Comments:  Continue natural tears daily at bedtime.  Requires daily assistance for activities of daily living (ADL) and comfort needs  Comments:  Physician orders reviewed, H&P/POC paperwork completed.            Electronically Signed By: ELLIE Armando   6/1/24  1:40 AM

## 2024-06-01 VITALS
HEART RATE: 83 BPM | SYSTOLIC BLOOD PRESSURE: 128 MMHG | DIASTOLIC BLOOD PRESSURE: 60 MMHG | OXYGEN SATURATION: 97 % | RESPIRATION RATE: 16 BRPM

## 2024-06-01 ASSESSMENT — ENCOUNTER SYMPTOMS
CONFUSION: 1
SHORTNESS OF BREATH: 0
NERVOUS/ANXIOUS: 0
APPETITE CHANGE: 0
DIZZINESS: 0
CHILLS: 0
ENDOCRINE NEGATIVE: 1
FREQUENCY: 0
CONSTIPATION: 0
EYE REDNESS: 1
ARTHRALGIAS: 1
HEADACHES: 0
RHINORRHEA: 0
FEVER: 0
WEAKNESS: 1
ALLERGIC/IMMUNOLOGIC NEGATIVE: 1
VOMITING: 0
COUGH: 0
SLEEP DISTURBANCE: 0
NAUSEA: 0
FATIGUE: 0
DIARRHEA: 0
GASTROINTESTINAL NEGATIVE: 1

## 2024-06-01 NOTE — PROGRESS NOTES
Subjective   Patient ID: Saritha Kapadia is a 89 y.o. female who is assisted living/ home patient being seen at [facility], and evaluated for Annual AL Medical Evaluation.     HPI     Pt visited in apartment of assisted living, oriented x1, comfortable and denies pain. Pt lying in bed, and welcomed visit. Pt answers with short responses, and yes and no. Pt denies changes in vision, and hearing. Pt with an upper denture, lost lower denture, denies difficulty with chewing and swallowing. Pt states appetite is good. Pt denies headache, dizziness, congestion and runny nose.     Pt denies chest pain and sob at rest and exertion. Pt denies voiding symptoms and constipation. Pt with arthritis to bilateral hands, but denies it is bothersome. Pt uses wheelchair for mobility, transfers with one person assist, and requires assistance with ADL's. No recent falls reported. Pt denies sleep disturbances. Pt denies feelings of anxiety and sadness. HPI limited due to cognition related to dementia.     Current Outpatient Medications on File Prior to Visit   Medication Sig Dispense Refill    acetaminophen (TylenoL) 325 mg tablet Take 2 tablets (650 mg) by mouth every 6 hours if needed for mild pain (1 - 3), headaches or fever (temp greater than 38.0 C).      nutritional drink (Boost) liquid Take by mouth.      vit A/vit C/vit E/zinc/copper (PRESERVISION AREDS ORAL) Take 1 capsule by mouth once daily.      white petrolatum-mineral oiL (Tears Naturale PM) 94-3 % ophthalmic ointment Apply 1 Application to right eye once daily at bedtime.       No current facility-administered medications on file prior to visit.      Review of Systems   Constitutional:  Negative for appetite change, chills, fatigue and fever.   HENT:  Negative for congestion and rhinorrhea.    Eyes:  Positive for redness.   Respiratory:  Negative for cough and shortness of breath.    Cardiovascular:  Negative for leg swelling.   Gastrointestinal:  Negative.  Negative for constipation, diarrhea, nausea and vomiting.   Endocrine: Negative.         Ptosis to right eye   Genitourinary: Negative.  Negative for frequency.   Musculoskeletal:  Positive for arthralgias and gait problem.        Utilizes wheelchair for mobility   Skin: Negative.  Negative for pallor.   Allergic/Immunologic: Negative.    Neurological:  Positive for weakness. Negative for dizziness and headaches.   Psychiatric/Behavioral:  Positive for confusion. Negative for sleep disturbance. The patient is not nervous/anxious.        Objective   /60 (BP Location: Left arm, Patient Position: Lying, BP Cuff Size: Adult)   Pulse 83   Resp 16   SpO2 97%     Physical Exam  Constitutional:       General: She is awake. She is not in acute distress.     Appearance: She is not ill-appearing.   HENT:      Head: Normocephalic.      Nose: No congestion or rhinorrhea.      Mouth/Throat:      Mouth: Mucous membranes are moist.   Eyes:      Pupils: Pupils are equal, round, and reactive to light.      Comments: Right eye ptosis   Cardiovascular:      Rate and Rhythm: Normal rate and regular rhythm.      Pulses: Normal pulses.      Heart sounds: Normal heart sounds.   Pulmonary:      Effort: Pulmonary effort is normal. No respiratory distress.      Breath sounds: Normal breath sounds. No wheezing or rhonchi.   Abdominal:      General: Bowel sounds are normal.   Musculoskeletal:         General: Normal range of motion.      Cervical back: Normal range of motion.      Right lower leg: No edema.      Left lower leg: No edema.   Skin:     General: Skin is warm.      Capillary Refill: Capillary refill takes less than 2 seconds.   Neurological:      Mental Status: She is alert. Mental status is at baseline.      Comments: Oriented x1   Psychiatric:         Mood and Affect: Mood normal.         Behavior: Behavior normal. Behavior is cooperative.         Cognition and Memory: Cognition is impaired. Memory is impaired.          Assessment/Plan   Diagnoses and all orders for this visit:  Vascular dementia, unspecified dementia severity, unspecified whether behavioral, psychotic, or mood disturbance or anxiety (Multi)  Comments:  Monitor for cognitive decline.  Arthritis  Comments:  Pain controlled.  Dry eye  Comments:  Continue natural tears daily at bedtime.  Requires daily assistance for activities of daily living (ADL) and comfort needs  Comments:  Physician orders reviewed, H&P/POC paperwork completed.

## 2024-12-10 ENCOUNTER — TELEPHONE (OUTPATIENT)
Dept: POST ACUTE CARE | Facility: EXTERNAL LOCATION | Age: 89
End: 2024-12-10
Payer: MEDICARE

## 2024-12-10 DIAGNOSIS — L03.115 CELLULITIS OF RIGHT LEG: Primary | ICD-10-CM

## 2024-12-10 RX ORDER — CEPHALEXIN 500 MG/1
500 CAPSULE ORAL 4 TIMES DAILY
Qty: 28 CAPSULE | Refills: 0 | Status: SHIPPED | OUTPATIENT
Start: 2024-12-10 | End: 2024-12-17

## 2024-12-10 NOTE — TELEPHONE ENCOUNTER
Tia GILL from [facility] calls, pt with cellulitis to RLE, redness , edema, with pale yellow center. Will send Keflex to [facility] pharmacy.

## 2025-01-15 ENCOUNTER — NURSING HOME VISIT (OUTPATIENT)
Dept: POST ACUTE CARE | Facility: EXTERNAL LOCATION | Age: OVER 89
End: 2025-01-15
Payer: MEDICARE

## 2025-01-15 DIAGNOSIS — H02.132 SENILE ECTROPION OF RIGHT LOWER EYELID: ICD-10-CM

## 2025-01-15 DIAGNOSIS — Z78.9 LIVING IN ASSISTED LIVING: ICD-10-CM

## 2025-01-15 DIAGNOSIS — F01.50 VASCULAR DEMENTIA, UNSPECIFIED DEMENTIA SEVERITY, UNSPECIFIED WHETHER BEHAVIORAL, PSYCHOTIC, OR MOOD DISTURBANCE OR ANXIETY (MULTI): Primary | ICD-10-CM

## 2025-01-15 DIAGNOSIS — Z51.5 HOSPICE CARE PATIENT: ICD-10-CM

## 2025-01-15 PROCEDURE — 99349 HOME/RES VST EST MOD MDM 40: CPT

## 2025-01-16 VITALS
HEART RATE: 68 BPM | WEIGHT: 114 LBS | RESPIRATION RATE: 16 BRPM | HEIGHT: 65 IN | DIASTOLIC BLOOD PRESSURE: 68 MMHG | BODY MASS INDEX: 18.99 KG/M2 | SYSTOLIC BLOOD PRESSURE: 120 MMHG

## 2025-01-16 RX ORDER — MORPHINE SULFATE 20 MG/ML
5 SOLUTION ORAL
COMMUNITY

## 2025-01-16 RX ORDER — POLYVINYL ALCOHOL, POVIDONE 14; 6 MG/ML; MG/ML
1 SOLUTION/ DROPS OPHTHALMIC NIGHTLY
COMMUNITY
Start: 2024-12-08

## 2025-01-16 RX ORDER — LORAZEPAM 0.5 MG/1
0.5 TABLET ORAL EVERY 6 HOURS PRN
COMMUNITY

## 2025-01-16 ASSESSMENT — ENCOUNTER SYMPTOMS
ARTHRALGIAS: 1
SHORTNESS OF BREATH: 0
CONSTIPATION: 0
HEADACHES: 0
DIZZINESS: 0
CHILLS: 0
RHINORRHEA: 0
NAUSEA: 0
APPETITE CHANGE: 0
EYE REDNESS: 1
FEVER: 0
WEAKNESS: 1
ENDOCRINE NEGATIVE: 1
ALLERGIC/IMMUNOLOGIC NEGATIVE: 1
FREQUENCY: 0
ROS GI COMMENTS: INCONTINENT OF BOWEL
NERVOUS/ANXIOUS: 0
DIARRHEA: 0
FATIGUE: 0
GASTROINTESTINAL NEGATIVE: 1
SLEEP DISTURBANCE: 0
VOMITING: 0
COUGH: 0

## 2025-01-17 NOTE — PROGRESS NOTES
"Subjective   Patient ID: Saritha Kapadia is a 89 y.o. female who is assisted living/ home patient being seen at [facility], and evaluated for Routine AL Visit.     HPI     Pt visited in secured memory care unit of assisted living, up in broda chair, oriented x1, comfortable and denies pain.  Pt answers simple questions with yes and no. Pt states appetite is good , however nursing reports a decrease in appetite. Pt at times is a total feed, but nursing states \"if it's something she wants like cake she will feed herself\". Pt denies headache, dizziness, congestion and runny nose. Pt with cellulitis to BLE a month ago, treated with Keflex which has now resolved.     Pt denies chest pain and sob. Pt incontinent of bowel and bladder. Pt utilizing a broda chair for mobility, and is a lyndsay lift for transfers. Pt denies sleep disturbances. Pt is total care for ADL's. Pt currently on hospice services. HPI limited due to cognition related to dementia. Collateral information obtained from nursing.     Current Outpatient Medications on File Prior to Visit   Medication Sig Dispense Refill    acetaminophen (TylenoL) 325 mg tablet Take 2 tablets (650 mg) by mouth every 6 hours if needed for mild pain (1 - 3), headaches or fever (temp greater than 38.0 C).      furosemide (Lasix) 10 MG split tablet Take 1 half tablet (10 mg) by mouth 2 times daily (morning and late afternoon).      LORazepam (Ativan) 0.5 mg tablet Take 1 tablet (0.5 mg) by mouth every 6 hours if needed for anxiety.      morphine 20 mg/mL concentrated oral solution Take 0.25 mL (5 mg) by mouth every 3 hours if needed for severe pain (7 - 10) or shortness of breath.      nutritional drink (Boost) liquid Take by mouth.      Refresh Classic, PF, 1.4-0.6 % ophthalmic solution Administer 1 drop into both eyes once daily at bedtime.      vit A/vit C/vit E/zinc/copper (PRESERVISION AREDS ORAL) Take 1 capsule by mouth once daily.      white " "petrolatum-mineral oiL (Tears Naturale PM) 94-3 % ophthalmic ointment Apply 1 Application to right eye once daily at bedtime.       No current facility-administered medications on file prior to visit.      Review of Systems   Constitutional:  Negative for appetite change, chills, fatigue and fever.   HENT:  Negative for congestion and rhinorrhea.    Eyes:  Positive for redness.   Respiratory:  Negative for cough and shortness of breath.    Cardiovascular:  Negative for leg swelling.   Gastrointestinal: Negative.  Negative for constipation, diarrhea, nausea and vomiting.        Incontinent of bowel   Endocrine: Negative.         Ptosis to right eye   Genitourinary: Negative.  Negative for frequency.        Incontinent of bladder   Musculoskeletal:  Positive for arthralgias and gait problem.        Broda chair, lyndsay lift for transfers.    Skin: Negative.  Negative for pallor.   Allergic/Immunologic: Negative.    Neurological:  Positive for weakness. Negative for dizziness and headaches.   Psychiatric/Behavioral:  Negative for sleep disturbance. The patient is not nervous/anxious.        Objective   /68 (BP Location: Right arm, Patient Position: Sitting, BP Cuff Size: Adult)   Pulse 68   Resp 16   Ht 1.651 m (5' 5\")   Wt 51.7 kg (114 lb)   BMI 18.97 kg/m²     Physical Exam  Constitutional:       General: She is awake. She is not in acute distress.     Appearance: She is not ill-appearing.   HENT:      Head: Normocephalic.      Nose: No congestion or rhinorrhea.      Mouth/Throat:      Mouth: Mucous membranes are moist.   Eyes:      Pupils: Pupils are equal, round, and reactive to light.      Comments: Right eye ptosis   Cardiovascular:      Rate and Rhythm: Normal rate and regular rhythm.      Pulses: Normal pulses.      Heart sounds: Normal heart sounds.   Pulmonary:      Effort: Pulmonary effort is normal. No respiratory distress.      Breath sounds: Normal breath sounds. No wheezing or rhonchi. "   Abdominal:      General: Bowel sounds are normal.   Musculoskeletal:         General: Normal range of motion.      Cervical back: Normal range of motion.      Right lower leg: No edema.      Left lower leg: No edema.   Skin:     General: Skin is warm.      Capillary Refill: Capillary refill takes less than 2 seconds.   Neurological:      Mental Status: She is alert. Mental status is at baseline.      Comments: Oriented x1   Psychiatric:         Mood and Affect: Mood normal.         Behavior: Behavior normal. Behavior is cooperative.         Cognition and Memory: Cognition is impaired. Memory is impaired.         Assessment/Plan   Diagnoses and all orders for this visit:  Vascular dementia, unspecified dementia severity, unspecified whether behavioral, psychotic, or mood disturbance or anxiety (Multi)  Comments:  Pt appropriate for secured memory care unit.  Senile ectropion of right lower eyelid  Comments:  Continue with drops and ointment for comfort.  Living in assisted living  Comments:  Medical and facility chart reviewed, medication reconciled and collaboration with nursing.  Hospice care patient  Comments:  Continue to follow hospice goals of care.